# Patient Record
Sex: FEMALE | Race: WHITE | NOT HISPANIC OR LATINO | Employment: OTHER | URBAN - METROPOLITAN AREA
[De-identification: names, ages, dates, MRNs, and addresses within clinical notes are randomized per-mention and may not be internally consistent; named-entity substitution may affect disease eponyms.]

---

## 2024-03-11 ENCOUNTER — TRANSFERRED RECORDS (OUTPATIENT)
Dept: HEALTH INFORMATION MANAGEMENT | Facility: CLINIC | Age: 64
End: 2024-03-11
Payer: COMMERCIAL

## 2024-03-18 ENCOUNTER — TRANSCRIBE ORDERS (OUTPATIENT)
Dept: OTHER | Age: 64
End: 2024-03-18

## 2024-03-18 DIAGNOSIS — G89.4 CHRONIC PAIN SYNDROME: Primary | ICD-10-CM

## 2024-03-18 DIAGNOSIS — M54.16 LUMBAR RADICULOPATHY: ICD-10-CM

## 2024-03-18 DIAGNOSIS — M47.812 CERVICAL SPONDYLOSIS: ICD-10-CM

## 2024-03-28 ENCOUNTER — THERAPY VISIT (OUTPATIENT)
Dept: PHYSICAL THERAPY | Facility: CLINIC | Age: 64
End: 2024-03-28
Attending: PAIN MEDICINE
Payer: COMMERCIAL

## 2024-03-28 DIAGNOSIS — M47.12 CERVICAL SPONDYLOSIS WITH MYELOPATHY: ICD-10-CM

## 2024-03-28 DIAGNOSIS — G89.29 GROIN PAIN, CHRONIC, RIGHT: ICD-10-CM

## 2024-03-28 DIAGNOSIS — R10.31 GROIN PAIN, CHRONIC, RIGHT: ICD-10-CM

## 2024-03-28 DIAGNOSIS — M54.16 LUMBAR RADICULOPATHY: ICD-10-CM

## 2024-03-28 DIAGNOSIS — M54.2 NECK PAIN: ICD-10-CM

## 2024-03-28 DIAGNOSIS — G89.4 CHRONIC PAIN SYNDROME: Primary | ICD-10-CM

## 2024-03-28 PROCEDURE — 97110 THERAPEUTIC EXERCISES: CPT | Mod: GP | Performed by: PHYSICAL THERAPIST

## 2024-03-28 PROCEDURE — 97112 NEUROMUSCULAR REEDUCATION: CPT | Mod: GP | Performed by: PHYSICAL THERAPIST

## 2024-03-28 PROCEDURE — 97161 PT EVAL LOW COMPLEX 20 MIN: CPT | Mod: GP | Performed by: PHYSICAL THERAPIST

## 2024-03-28 NOTE — PROGRESS NOTES
PHYSICAL THERAPY EVALUATION  Type of Visit: Evaluation  Patient reports having occipital neuralgia since 2021 after falling asleep with her neck flexed 2 nights in a row.  She has been able to manage her symptoms using a Theracane mostly.  She has pain in the back of her head at night but can reposition her head on her pillow and resolve the symptoms.  Looking down for prolonged periods as well as doing any lifting will increase her head pain.   She has MD orders dated 03/18 for PT.       She reports onset of R groin pain 10/28/2023 when she was doing some faster walking with longer strides.  She mostly notices it with talking longer strides with walking and with standing, but she can also notice the pain with sitting sometimes.       See electronic medical record for Abuse and Falls Screening details.    Subjective       Presenting condition or subjective complaint: neck and spine/groin  Date of onset: 10/28/23 (R groin; neck 2021 but MD orders 03/28/2024 for PT)    Relevant medical history: Arthritis; Hearing problems; Hepatitis; High blood pressure; History of fractures; Menopause; Neck injury; Osteoporosis; Overweight; Pain at night or rest; Thyroid problems   Dates & types of surgery: L ankle surgery, hysterectomy, breast    Prior diagnostic imaging/testing results: MRI; CT scan; Bone scan     Prior therapy history for the same diagnosis, illness or injury: Yes spine PT 2023--exercises--helpful for neck    Prior Level of Function  Transfers: Independent  Ambulation: Independent  ADL: Independent  IADL:     Living Environment  Social support: With a significant other or spouse   Type of home: House; 1 level   Stairs to enter the home: Yes 5 Is there a railing: Yes   Ramp: No   Stairs inside the home: Yes 12 Is there a railing: Yes   Help at home: None  Equipment owned: -- (none)     Employment: No    Hobbies/Interests: walking, gardening, travel    Patient goals for therapy: Dr Funes wanted me to learn/treat  with the Jani Method    Pain assessment: Location: B posterior head/neck; R groin/Rating: neck and groin 7/10     Objective   LUMBAR SPINE EVALUATION  PAIN: Pain Level at Rest: 0/10  Pain Level with Use: 7/10  Pain Location: R  groin  Pain Quality: Sharp  Pain Frequency: intermittent  Pain is Worst: no effect day or night  Pain is Exacerbated By: walking--taking longer strides, standing 1 hour  Pain is Relieved By: sitting  Pain Progression: Unchanged  INTEGUMENTARY (edema, incisions):   POSTURE: Sitting Posture: Lordosis decreased  GAIT:   Weightbearing Status:   Assistive Device(s):   Gait Deviations: WNL  BALANCE/PROPRIOCEPTION:   WEIGHTBEARING ALIGNMENT:   NON-WEIGHTBEARING ALIGNMENT:    ROM:   (Degrees) Left AROM Left PROM  Right AROM Right PROM   Hip Flexion       Hip Extension       Hip Abduction       Hip Adduction       Hip Internal Rotation       Hip External Rotation       Knee Flexion       Knee Extension       Lumbar Side glide Nil loss, NE Nil loss, NE   Lumbar Flexion Nil loss, NE   Lumbar Extension Min loss, NE   Pain:   End feel:   PELVIC/SI SCREEN:   STRENGTH:     MYOTOMES:   DTR S:   CORD SIGNS:   DERMATOMES:   NEURAL TENSION:   FLEXIBILITY:   LUMBAR/HIP Special Tests:    PELVIS/SI SPECIAL TESTS:   FUNCTIONAL TESTS:   PALPATION:   SPINAL SEGMENTAL CONCLUSIONS:     Repeated motions:  RFIL--p. B groin pain, NW, NE ROM  REIL--NE, NE, NE    CERVICAL SPINE EVALUATION  PAIN: Pain Level at Rest: 0/10  Pain Level with Use: 7/10  Pain Location: B upper cervical and posterior head, sometimes lateral head  Pain Quality: Pressure, Sharp, and severe  Pain Frequency: intermittent  Pain is Worst: no effect time of day  Pain is Exacerbated By: sleeping and being in the wrong position; lifting anything so avoids, looking down for reading  Pain is Relieved By: repositioning on pillow at night, using theracane  Pain Progression: Unchanged  INTEGUMENTARY (edema, incisions):   POSTURE: sitting--rounded shoulders,  "fwd head  GAIT:   Weightbearing Status:   Assistive Device(s):   Gait Deviations:   BALANCE/PROPRIOCEPTION:   WEIGHTBEARING ALIGNMENT:   ROM: cervical AROM:  B rotation min loss, \"tight\" both ways; extension min loss, NE; flexion min loss, NE    MYOTOMES:   DTR S:   CORD SIGNS:   DERMATOMES:   NEURAL TENSION:   FLEXIBILITY:    SPECIAL TESTS:   PALPATION:   SPINAL SEGMENTAL CONCLUSIONS:   Repeated motions  Rep cerv retraction w/self-OP--NE during, better after, decreased tightness B rotation  Rep cerv ret w/self-OP and upper cervical flexion--NE during, better after, decreased B neck tightness with B rotation after--more than without upper cervical flexion      Assessment & Plan   CLINICAL IMPRESSIONS  Medical Diagnosis: Chronic pain syndrome (G89.4)  - Primary    Cervical spondylosis (M47.812)    Lumbar radiculopathy (M54.16    Treatment Diagnosis: neck pain, R groin pain, lumbar radiculopathy   Impression/Assessment: Patient is a 63 year old female with cervical/head complaints.  The following significant findings have been identified: Pain, Decreased ROM/flexibility, Decreased activity tolerance, and Impaired posture. These impairments interfere with their ability to perform self care tasks, recreational activities, household chores, and driving  as compared to previous level of function.     Clinical Decision Making (Complexity):  Clinical Presentation: Stable/Uncomplicated  Clinical Presentation Rationale: based on medical and personal factors listed in PT evaluation  Clinical Decision Making (Complexity): Low complexity    PLAN OF CARE  Treatment Interventions:  Interventions: Manual Therapy, Neuromuscular Re-education, Therapeutic Activity, Therapeutic Exercise, Self-Care/Home Management    Long Term Goals     PT Goal 1  Goal Identifier: lifting  Goal Description: Patient will be able to lift object weighing 10# without neck/head pain  Rationale: to maximize safety and independence with performance of ADLs and " functional tasks  Goal Progress: currently avoiding lifting anything  Target Date: 05/09/24  PT Goal 2  Goal Identifier: ambulation  Goal Description: Patient will be able to ambulate any distance with any stride length without R groin pain  Rationale: to maximize safety and independence within the community  Goal Progress: currently up to 7/10 R groin pain with ambulating with greater stride length immediately  Target Date: 05/09/24      Frequency of Treatment: 1x/week  Duration of Treatment: 6 weeks    Recommended Referrals to Other Professionals: none  Education Assessment:   Learner/Method: Patient;Listening;Reading;Demonstration;Pictures/Video;No Barriers to Learning    Risks and benefits of evaluation/treatment have been explained.   Patient/Family/caregiver agrees with Plan of Care.     Evaluation Time:     PT Eval, Low Complexity Minutes (45136): 22       Signing Clinician: Zully Cabrera PT      [facility]                                                                                   OUTPATIENT PHYSICAL THERAPY      PLAN OF TREATMENT FOR OUTPATIENT REHABILITATION   Patient's Last Name, First Name, LUZMARIADenise Pope YOB: 1960   Provider's Name   [facility]   Medical Record No.  [id number]     Onset Date: 10/28/23 (R groin; neck 2021 but MD orders 03/28/2024 for PT)  Start of Care Date: 03/28/24     Medical Diagnosis:  Chronic pain syndrome (G89.4)  - Primary    Cervical spondylosis (M47.812)    Lumbar radiculopathy (M54.16      PT Treatment Diagnosis:  neck pain, R groin pain, lumbar radiculopathy Plan of Treatment  Frequency/Duration: 1x/week/ 6 weeks    Certification date from 03/28/24 to 05/09/24         See note for plan of treatment details and functional goals     Zully Cabrera PT                         I CERTIFY THE NEED FOR THESE SERVICES FURNISHED UNDER        THIS PLAN OF TREATMENT AND WHILE UNDER MY CARE .              Physician Signature               Date    X_____________________________________________________                  Referring Provider:  William Funes    Initial Assessment  See Epic Evaluation- Start of Care Date: 03/28/24

## 2024-04-05 ENCOUNTER — THERAPY VISIT (OUTPATIENT)
Dept: PHYSICAL THERAPY | Facility: CLINIC | Age: 64
End: 2024-04-05
Attending: PAIN MEDICINE
Payer: COMMERCIAL

## 2024-04-05 DIAGNOSIS — G89.4 CHRONIC PAIN SYNDROME: Primary | ICD-10-CM

## 2024-04-05 DIAGNOSIS — G89.29 GROIN PAIN, CHRONIC, RIGHT: ICD-10-CM

## 2024-04-05 DIAGNOSIS — M47.12 CERVICAL SPONDYLOSIS WITH MYELOPATHY: ICD-10-CM

## 2024-04-05 DIAGNOSIS — M54.2 NECK PAIN: ICD-10-CM

## 2024-04-05 DIAGNOSIS — M54.16 LUMBAR RADICULOPATHY: ICD-10-CM

## 2024-04-05 DIAGNOSIS — R10.31 GROIN PAIN, CHRONIC, RIGHT: ICD-10-CM

## 2024-04-05 PROCEDURE — 97112 NEUROMUSCULAR REEDUCATION: CPT | Mod: GP | Performed by: PHYSICAL THERAPIST

## 2024-04-05 PROCEDURE — 97110 THERAPEUTIC EXERCISES: CPT | Mod: GP | Performed by: PHYSICAL THERAPIST

## 2024-04-12 ENCOUNTER — THERAPY VISIT (OUTPATIENT)
Dept: PHYSICAL THERAPY | Facility: CLINIC | Age: 64
End: 2024-04-12
Payer: COMMERCIAL

## 2024-04-12 DIAGNOSIS — M54.2 NECK PAIN: ICD-10-CM

## 2024-04-12 DIAGNOSIS — M47.12 CERVICAL SPONDYLOSIS WITH MYELOPATHY: ICD-10-CM

## 2024-04-12 DIAGNOSIS — M54.16 LUMBAR RADICULOPATHY: ICD-10-CM

## 2024-04-12 DIAGNOSIS — G89.4 CHRONIC PAIN SYNDROME: Primary | ICD-10-CM

## 2024-04-12 DIAGNOSIS — R10.31 GROIN PAIN, CHRONIC, RIGHT: ICD-10-CM

## 2024-04-12 DIAGNOSIS — G89.29 GROIN PAIN, CHRONIC, RIGHT: ICD-10-CM

## 2024-04-12 PROCEDURE — 97110 THERAPEUTIC EXERCISES: CPT | Mod: GP | Performed by: PHYSICAL THERAPIST

## 2024-04-19 ENCOUNTER — THERAPY VISIT (OUTPATIENT)
Dept: PHYSICAL THERAPY | Facility: CLINIC | Age: 64
End: 2024-04-19
Payer: COMMERCIAL

## 2024-04-19 DIAGNOSIS — R10.31 GROIN PAIN, CHRONIC, RIGHT: ICD-10-CM

## 2024-04-19 DIAGNOSIS — M54.2 NECK PAIN: ICD-10-CM

## 2024-04-19 DIAGNOSIS — G89.4 CHRONIC PAIN SYNDROME: Primary | ICD-10-CM

## 2024-04-19 DIAGNOSIS — G89.29 GROIN PAIN, CHRONIC, RIGHT: ICD-10-CM

## 2024-04-19 DIAGNOSIS — M47.12 CERVICAL SPONDYLOSIS WITH MYELOPATHY: ICD-10-CM

## 2024-04-19 DIAGNOSIS — M54.16 LUMBAR RADICULOPATHY: ICD-10-CM

## 2024-04-19 PROCEDURE — 97110 THERAPEUTIC EXERCISES: CPT | Mod: GP | Performed by: PHYSICAL THERAPIST

## 2024-04-26 ENCOUNTER — THERAPY VISIT (OUTPATIENT)
Dept: PHYSICAL THERAPY | Facility: CLINIC | Age: 64
End: 2024-04-26
Payer: COMMERCIAL

## 2024-04-26 DIAGNOSIS — M54.16 LUMBAR RADICULOPATHY: ICD-10-CM

## 2024-04-26 DIAGNOSIS — M54.2 NECK PAIN: ICD-10-CM

## 2024-04-26 DIAGNOSIS — G89.4 CHRONIC PAIN SYNDROME: Primary | ICD-10-CM

## 2024-04-26 DIAGNOSIS — R10.31 GROIN PAIN, CHRONIC, RIGHT: ICD-10-CM

## 2024-04-26 DIAGNOSIS — G89.29 GROIN PAIN, CHRONIC, RIGHT: ICD-10-CM

## 2024-04-26 DIAGNOSIS — M47.12 CERVICAL SPONDYLOSIS WITH MYELOPATHY: ICD-10-CM

## 2024-04-26 PROCEDURE — 97110 THERAPEUTIC EXERCISES: CPT | Mod: GP | Performed by: PHYSICAL THERAPIST

## 2024-04-26 PROCEDURE — 97530 THERAPEUTIC ACTIVITIES: CPT | Mod: GP | Performed by: PHYSICAL THERAPIST

## 2024-05-03 ENCOUNTER — THERAPY VISIT (OUTPATIENT)
Dept: PHYSICAL THERAPY | Facility: CLINIC | Age: 64
End: 2024-05-03
Payer: COMMERCIAL

## 2024-05-03 DIAGNOSIS — R10.31 GROIN PAIN, CHRONIC, RIGHT: ICD-10-CM

## 2024-05-03 DIAGNOSIS — G89.29 GROIN PAIN, CHRONIC, RIGHT: ICD-10-CM

## 2024-05-03 DIAGNOSIS — M54.16 LUMBAR RADICULOPATHY: ICD-10-CM

## 2024-05-03 DIAGNOSIS — M47.12 CERVICAL SPONDYLOSIS WITH MYELOPATHY: ICD-10-CM

## 2024-05-03 DIAGNOSIS — M54.2 NECK PAIN: ICD-10-CM

## 2024-05-03 DIAGNOSIS — G89.4 CHRONIC PAIN SYNDROME: Primary | ICD-10-CM

## 2024-05-03 PROCEDURE — 97112 NEUROMUSCULAR REEDUCATION: CPT | Mod: GP | Performed by: PHYSICAL THERAPIST

## 2024-05-03 PROCEDURE — 97110 THERAPEUTIC EXERCISES: CPT | Mod: GP | Performed by: PHYSICAL THERAPIST

## 2024-05-03 NOTE — PROGRESS NOTES
[facility]                                                                                   OUTPATIENT PHYSICAL THERAPY    PLAN OF TREATMENT FOR OUTPATIENT REHABILITATION   Patient's Last Name, First Name, Denise Maxwell  Navya YOB: 1960   Provider's Name   [facility]   Medical Record No.  [id number]     Onset Date: 10/28/23 (R groin; neck 2021 but MD orders 03/28/2024 for PT)  Start of Care Date: 03/28/24     Medical Diagnosis:  Chronic pain syndrome (G89.4)  - Primary    Cervical spondylosis (M47.812)    Lumbar radiculopathy (M54.16      PT Treatment Diagnosis:  neck pain, R groin pain, lumbar radiculopathy Plan of Treatment  Frequency/Duration: 1x/week/ 6 additional weeks    Certification date from 05/10/24 to 06/21/24         See note for plan of treatment details and functional goals     Zully Cabrera PT                         I CERTIFY THE NEED FOR THESE SERVICES FURNISHED UNDER        THIS PLAN OF TREATMENT AND WHILE UNDER MY CARE .             Physician Signature               Date    X_____________________________________________________                  Referring Provider:  William Funes    Initial Assessment  See Epic Evaluation- Start of Care Date: 03/28/24            PLAN  Continue therapy per current plan of care.    Beginning/End Dates of Progress Note Reporting Period:  03/28/24 to 05/03/2024    Referring Provider:  William Funes     05/03/24 0500   Appointment Info   Signing clinician's name / credentials Zully Cabrera PT   Total/Authorized Visits 12 (PT estimate updated on PN 05/03/2024)   Visits Used 6   Medical Diagnosis Chronic pain syndrome (G89.4)  - Primary    Cervical spondylosis (M47.812)    Lumbar radiculopathy (M54.16   PT Tx Diagnosis neck pain, R groin pain, lumbar radiculopathy   Quick Adds Certification   Progress Note/Certification   Start of Care Date 03/28/24   Onset of illness/injury or Date  "of Surgery 10/28/23  (R groin; neck 2021 but MD orders 03/28/2024 for PT)   Therapy Frequency 1x/week   Predicted Duration 6 additional weeks   Certification date from 05/10/24   Certification date to 06/21/24   Progress Note Due Date 05/03/24   Progress Note Completed Date 03/28/24   GOALS   PT Goals 2   PT Goal 1   Goal Identifier lifting   Goal Description Patient will be able to lift object weighing 10# without neck/head pain   Rationale to maximize safety and independence with performance of ADLs and functional tasks   Goal Progress Has not tested at home due to paperwork.  Continue and extend timeframe to allow time for her to test.   Target Date 06/21/24   PT Goal 2   Goal Identifier ambulation   Goal Description Patient will be able to ambulate any distance with any stride length without R groin pain   Rationale to maximize safety and independence within the community   Goal Progress Has not been testing due to being busy.  Continue and extend timeframe to allow time for her to test.   Target Date 06/21/24   Subjective Report   Subjective Report Patient reports, \"really pretty good.\"  She had 3 days this week when she was busy so didn't do the exercises as much.  She also has not been out walking as much as a result of being busy.  She has not had opportunity or occasion to do much lifting at home again due to being busy.  She was pleased to discover when she had pain in her R thigh area and did extension exercises and was able to resolve it.   Objective Measures   Objective Measures Objective Measure 1;Objective Measure 2   Objective Measure 1   Objective Measure Cervical AROM:   Objective Measure 2   Objective Measure concordant sign   Details L knee pain walking--abolished after lumbar extension exercises.   Treatment Interventions (PT)   Interventions Therapeutic Procedure/Exercise;Therapeutic Activity;Neuromuscular Re-education;Manual Therapy   Therapeutic Procedure/Exercise   Therapeutic Procedures: " strength, endurance, ROM, flexibility minutes (43088) 30   Therapeutic Procedures Ther Proc 2;Ther Proc 3;Ther Proc 4;Ther Proc 5;Ther Proc 6;Ther Proc 7;Ther Proc 8;Ther Proc 9;Ther Proc 10   Ther Proc 1 seated retraction w/self-OP and upper cervical flexion   Ther Proc 1 - Details HEP--continue as these are helpfing   Ther Proc 2 REIL w/self-OP   Ther Proc 2 - Details x15 reps total --continue   Ther Proc 3 NILESH   Ther Proc 3 - Details kneeling since this worked well at [facility] x20 reps total--abolished L knee pain with walking at home--continue in standing or kneeling   Ther Proc 4 bridge   Ther Proc 4 - Details #1 w/green TB at knees x25 reps--VC to maintain tension on band to activate glutes   Ther Proc 5 abdom ex   Ther Proc 5 - Details #4 w/leg drop to increase challenge--x10 reps B--VC, MC for abs and not posteriorly tilting pelvis   Ther Proc 6 rep t-ext in sitting   Ther Proc 6 - Details continue   Ther Proc 7 SL clams   Ther Proc 7 - Details green TB at knees--x30 reps B--good technique--fatigue at 30 reps   Ther Proc 8 scaption   Ther Proc 8 - Details B 1# x30 reps--VC, MC, VSC for form and scap position   Skilled Intervention VC, MC, VSC for form   Patient Response/Progress abolished superior head pain and better after repeated ret w/self-OP--able to perform strengthening exercises without pain if mindful of position   Neuromuscular Re-education   Neuromuscular re-ed of mvmt, balance, coord, kinesthetic sense, posture, proprioception minutes (29937) 10   Neuromuscular Re-education Neuro Re-ed 2;Neuro Re-ed 3;Neuro Re-ed 4   Neuro Re-ed 1 rows   Neuro Re-ed 1 - Details green x25 reps--VC, MC, VSC for scap use and form   Neuro Re-ed 2 B ER w/TB   Neuro Re-ed 2 - Details red--continue at home   Neuro Re-ed 3 pulldowns   Neuro Re-ed 3 - Details red x20 reps--VC, MC, VSC for form   Skilled Intervention VC, MC, VSC for scap use and form   Patient Response/Progress able to perform correctly   Neuro Re-ed 4  sidestep w/TB   Neuro Re-ed 4 - Details green TB at toes--along table holding on as needed 4x down and back---VC, VSC for form and position, maintaining tension on band   Education   Learner/Method Patient;Listening;Reading;Demonstration;Pictures/Video;No Barriers to Learning   Plan   Plan for next session continue lumbar extension, cervical and thoracic extension, core and posture exercises   Total Session Time   Timed Code Treatment Minutes 40   Total Treatment Time (sum of timed and untimed services) 40

## 2024-05-10 ENCOUNTER — THERAPY VISIT (OUTPATIENT)
Dept: PHYSICAL THERAPY | Facility: CLINIC | Age: 64
End: 2024-05-10
Payer: COMMERCIAL

## 2024-05-10 DIAGNOSIS — M47.12 CERVICAL SPONDYLOSIS WITH MYELOPATHY: ICD-10-CM

## 2024-05-10 DIAGNOSIS — G89.4 CHRONIC PAIN SYNDROME: Primary | ICD-10-CM

## 2024-05-10 DIAGNOSIS — M54.16 LUMBAR RADICULOPATHY: ICD-10-CM

## 2024-05-10 DIAGNOSIS — M54.2 NECK PAIN: ICD-10-CM

## 2024-05-10 DIAGNOSIS — G89.29 GROIN PAIN, CHRONIC, RIGHT: ICD-10-CM

## 2024-05-10 DIAGNOSIS — R10.31 GROIN PAIN, CHRONIC, RIGHT: ICD-10-CM

## 2024-05-10 PROCEDURE — 97530 THERAPEUTIC ACTIVITIES: CPT | Mod: GP | Performed by: PHYSICAL THERAPIST
